# Patient Record
Sex: FEMALE | Race: WHITE | Employment: OTHER | URBAN - METROPOLITAN AREA
[De-identification: names, ages, dates, MRNs, and addresses within clinical notes are randomized per-mention and may not be internally consistent; named-entity substitution may affect disease eponyms.]

---

## 2017-10-09 ENCOUNTER — OFFICE VISIT (OUTPATIENT)
Dept: OTHER | Facility: HOSPITAL | Age: 67
End: 2017-10-09
Attending: PREVENTIVE MEDICINE

## 2017-10-09 DIAGNOSIS — Z00.00 ANNUAL PHYSICAL EXAM: Primary | ICD-10-CM

## 2017-11-01 ENCOUNTER — APPOINTMENT (OUTPATIENT)
Dept: OTHER | Facility: HOSPITAL | Age: 67
End: 2017-11-01
Attending: FAMILY MEDICINE

## 2018-12-24 NOTE — LETTER
WHERE IS YOUR PAIN NOW?  Jazmin the areas on your body where you feel the described sensations.  Use the appropriate symbol.  Jazmin the areas of radiation.  Include all affected areas.  Just to complete the picture, please draw in the face.     ACHE:  ^ ^ ^   NUMBNESS:  0000   PINS & NEEDLES:  = = = =                              ^ ^ ^                       0000              = = = =                                    ^ ^ ^                       0000            = = = =      BURNING:  XXXX   STABBING: ////                  XXXX                ////                         XXXX          ////     Please jazmin the line below indicating your degree of pain right now  with 0 being no pain 10 being the worst pain possible.                                         0             1             2              3             4              5              6              7             8             9             10         Patient Signature:             no Passive ROM deficits were identified

## 2019-06-28 ENCOUNTER — OFFICE VISIT (OUTPATIENT)
Dept: OTHER | Facility: HOSPITAL | Age: 69
End: 2019-06-28
Attending: PREVENTIVE MEDICINE

## 2019-06-28 DIAGNOSIS — Z00.00 ANNUAL PHYSICAL EXAM: Primary | ICD-10-CM

## 2019-06-28 PROCEDURE — 93005 ELECTROCARDIOGRAM TRACING: CPT

## 2022-08-19 ENCOUNTER — OFFICE VISIT (OUTPATIENT)
Dept: URGENT CARE | Facility: URGENT CARE | Age: 72
End: 2022-08-19
Payer: MEDICARE

## 2022-08-19 VITALS
OXYGEN SATURATION: 98 % | HEART RATE: 87 BPM | SYSTOLIC BLOOD PRESSURE: 115 MMHG | DIASTOLIC BLOOD PRESSURE: 78 MMHG | WEIGHT: 118 LBS

## 2022-08-19 DIAGNOSIS — Z48.01 ENCOUNTER FOR CHANGE OR REMOVAL OF SURGICAL WOUND DRESSING: Primary | ICD-10-CM

## 2022-08-19 PROCEDURE — 99202 OFFICE O/P NEW SF 15 MIN: CPT | Performed by: STUDENT IN AN ORGANIZED HEALTH CARE EDUCATION/TRAINING PROGRAM

## 2022-08-19 NOTE — PROGRESS NOTES
S: Patient is here today for suture removal.  The patient reports no complications with wound.  Sutures were placed 12 days ago.  Sutures were placed at ER outside of [place], [place].    o: Wound is well healed, no signs of secondary infection.  Sutures removed without complication.    A: Laceration    P: 14 Sutures removed, F/U PRN.    Pepe Redding DO, MBA

## 2024-04-17 ENCOUNTER — HOSPITAL ENCOUNTER (OUTPATIENT)
Dept: GENERAL RADIOLOGY | Facility: HOSPITAL | Age: 74
Discharge: HOME | End: 2024-04-17
Attending: PHYSICAL MEDICINE & REHABILITATION
Payer: MEDICARE

## 2024-04-17 ENCOUNTER — OFFICE VISIT (OUTPATIENT)
Dept: PHYSICAL MEDICINE AND REHAB | Facility: CLINIC | Age: 74
End: 2024-04-17
Payer: MEDICARE

## 2024-04-17 ENCOUNTER — LAB ENCOUNTER (OUTPATIENT)
Dept: LAB | Facility: HOSPITAL | Age: 74
End: 2024-04-17
Attending: PHYSICAL MEDICINE & REHABILITATION
Payer: MEDICARE

## 2024-04-17 VITALS — HEART RATE: 91 BPM | WEIGHT: 100 LBS | OXYGEN SATURATION: 100 %

## 2024-04-17 DIAGNOSIS — M81.0 SENILE OSTEOPOROSIS: ICD-10-CM

## 2024-04-17 DIAGNOSIS — M54.42 CHRONIC LEFT-SIDED LOW BACK PAIN WITH LEFT-SIDED SCIATICA: ICD-10-CM

## 2024-04-17 DIAGNOSIS — G89.29 CHRONIC LEFT-SIDED LOW BACK PAIN WITH LEFT-SIDED SCIATICA: ICD-10-CM

## 2024-04-17 DIAGNOSIS — M25.50 POLYARTHRALGIA: Primary | ICD-10-CM

## 2024-04-17 DIAGNOSIS — K21.9 GASTROESOPHAGEAL REFLUX DISEASE, UNSPECIFIED WHETHER ESOPHAGITIS PRESENT: ICD-10-CM

## 2024-04-17 DIAGNOSIS — F41.1 ANXIETY STATE: ICD-10-CM

## 2024-04-17 PROBLEM — E21.3 HYPERPARATHYROIDISM (HCC): Status: ACTIVE | Noted: 2023-12-14

## 2024-04-17 PROBLEM — I10 HIGH BLOOD PRESSURE: Status: ACTIVE | Noted: 2024-04-17

## 2024-04-17 PROBLEM — M65.9 TENOSYNOVITIS OF LEFT FOOT: Status: ACTIVE | Noted: 2020-01-14

## 2024-04-17 PROBLEM — M72.2 PLANTAR FASCIITIS: Status: ACTIVE | Noted: 2020-01-14

## 2024-04-17 PROBLEM — G57.50 TARSAL TUNNEL SYNDROME: Status: ACTIVE | Noted: 2020-01-14

## 2024-04-17 PROBLEM — N28.9 ABNORMAL RENAL FUNCTION: Status: ACTIVE | Noted: 2021-10-19

## 2024-04-17 PROBLEM — K22.70 BARRETT'S ESOPHAGUS: Status: ACTIVE | Noted: 2022-12-27

## 2024-04-17 PROBLEM — M25.552 PAIN OF LEFT HIP JOINT: Status: ACTIVE | Noted: 2023-11-08

## 2024-04-17 LAB
BASOPHILS # BLD AUTO: 0.02 X10(3) UL (ref 0–0.2)
BASOPHILS NFR BLD AUTO: 0.4 %
DEPRECATED RDW RBC AUTO: 52.4 FL (ref 35.1–46.3)
EOSINOPHIL # BLD AUTO: 0.02 X10(3) UL (ref 0–0.7)
EOSINOPHIL NFR BLD AUTO: 0.4 %
ERYTHROCYTE [DISTWIDTH] IN BLOOD BY AUTOMATED COUNT: 14.7 % (ref 11–15)
ERYTHROCYTE [SEDIMENTATION RATE] IN BLOOD: 18 MM/HR
HCT VFR BLD AUTO: 34 %
HGB BLD-MCNC: 11.1 G/DL
IMM GRANULOCYTES # BLD AUTO: 0.01 X10(3) UL (ref 0–1)
IMM GRANULOCYTES NFR BLD: 0.2 %
LYMPHOCYTES # BLD AUTO: 1.16 X10(3) UL (ref 1–4)
LYMPHOCYTES NFR BLD AUTO: 22.7 %
MCH RBC QN AUTO: 31.4 PG (ref 26–34)
MCHC RBC AUTO-ENTMCNC: 32.6 G/DL (ref 31–37)
MCV RBC AUTO: 96 FL
MONOCYTES # BLD AUTO: 0.39 X10(3) UL (ref 0.1–1)
MONOCYTES NFR BLD AUTO: 7.6 %
NEUTROPHILS # BLD AUTO: 3.51 X10 (3) UL (ref 1.5–7.7)
NEUTROPHILS # BLD AUTO: 3.51 X10(3) UL (ref 1.5–7.7)
NEUTROPHILS NFR BLD AUTO: 68.7 %
PLATELET # BLD AUTO: 305 10(3)UL (ref 150–450)
RBC # BLD AUTO: 3.54 X10(6)UL
RHEUMATOID FACT SERPL-ACNC: <10 IU/ML (ref ?–14)
TSI SER-ACNC: 1.66 MIU/ML (ref 0.55–4.78)
VIT D+METAB SERPL-MCNC: 55.2 NG/ML (ref 30–100)
WBC # BLD AUTO: 5.1 X10(3) UL (ref 4–11)

## 2024-04-17 PROCEDURE — 99204 OFFICE O/P NEW MOD 45 MIN: CPT | Performed by: PHYSICAL MEDICINE & REHABILITATION

## 2024-04-17 PROCEDURE — 36415 COLL VENOUS BLD VENIPUNCTURE: CPT

## 2024-04-17 PROCEDURE — 85025 COMPLETE CBC W/AUTO DIFF WBC: CPT

## 2024-04-17 PROCEDURE — 85652 RBC SED RATE AUTOMATED: CPT

## 2024-04-17 PROCEDURE — 1159F MED LIST DOCD IN RCRD: CPT | Performed by: PHYSICAL MEDICINE & REHABILITATION

## 2024-04-17 PROCEDURE — 82164 ANGIOTENSIN I ENZYME TEST: CPT

## 2024-04-17 PROCEDURE — 72100 X-RAY EXAM L-S SPINE 2/3 VWS: CPT | Performed by: PHYSICAL MEDICINE & REHABILITATION

## 2024-04-17 PROCEDURE — 1125F AMNT PAIN NOTED PAIN PRSNT: CPT | Performed by: PHYSICAL MEDICINE & REHABILITATION

## 2024-04-17 PROCEDURE — 84443 ASSAY THYROID STIM HORMONE: CPT

## 2024-04-17 PROCEDURE — 86225 DNA ANTIBODY NATIVE: CPT

## 2024-04-17 PROCEDURE — 82306 VITAMIN D 25 HYDROXY: CPT

## 2024-04-17 PROCEDURE — 86200 CCP ANTIBODY: CPT

## 2024-04-17 PROCEDURE — 86038 ANTINUCLEAR ANTIBODIES: CPT

## 2024-04-17 PROCEDURE — 86431 RHEUMATOID FACTOR QUANT: CPT

## 2024-04-17 RX ORDER — EPINEPHRINE 0.3 MG/.3ML
1 INJECTION SUBCUTANEOUS DAILY
COMMUNITY

## 2024-04-17 RX ORDER — ESOMEPRAZOLE MAGNESIUM 40 MG/1
40 CAPSULE, DELAYED RELEASE ORAL 2 TIMES DAILY
COMMUNITY

## 2024-04-17 RX ORDER — ASPIRIN 81 MG/1
TABLET ORAL
COMMUNITY

## 2024-04-17 RX ORDER — TRIAMCINOLONE ACETONIDE 55 UG/1
SPRAY, METERED NASAL
COMMUNITY

## 2024-04-17 NOTE — PROGRESS NOTES
[facility]  Progress Note    CHIEF COMPLAINT:    Chief Complaint   Patient presents with    New Patient     New R handed patient is here for b/l low back and b/l hip pain. Denies injury. She' shad low back pain for years and the hip pain began in 10/2023. XR hips 11/09/23. PT for L sciatic pain with no relief. T/n in L hip that radiates down LLE. She is unable to sit more than 10 mins from the pain. L hip injection on 01/08/24 with no relief. Pain 8/10. Tylenol prn.        History of Present Illness:  Melanie Smiley is a 74 year old female who is being seen for a new patient evaluation. She is a neighbor and friend of Ismael Humphrey.  She is a nebulous historian with widespread pain from the neck, down the posterior torso into the left buttock, leg and foot.  The right low back and buttock also hurts.  She also showed me a left knee brace that she uses.  She tells me she saw an orthopedist who treated her for left hip pain with what appears to be a trochanteric bursa injection which was no help.  Despite the fact she received a steroid injection, she states she can't take steroids due to a diagnosis of pemphigus.  She did bring in an x-ray of the hip.  Prolonged sitting, standing or any activity is not possible due to the pain.  Pain is 8/10.    PAST MEDICAL HISTORY:  Past Medical History:    Allergic rhinitis    Essential hypertension       SURGICAL HISTORY:  No past surgical history on file.    SOCIAL HISTORY:   Social History     Occupational History    Not on file   Tobacco Use    Smoking status: Never    Smokeless tobacco: Not on file   Substance and Sexual Activity    Alcohol use: No    Drug use: No    Sexual activity: Not on file       CURRENT MEDICATIONS:   Current Outpatient Medications   Medication Sig Dispense Refill    aspirin 81 MG Oral Tab EC 0      Esomeprazole Magnesium 40 MG Oral Capsule Delayed Release Take 1 capsule (40 mg total) by mouth 2 (two)  times daily.      Nutritional Supplements (JUICE PLUS FIBRE) Oral Liquid       triamcinolone (NASACORT ALLERGY 24HR) 55 MCG/ACT Nasal Aerosol Spray 2 sprays every day by intranasal route.      Losartan Potassium (COZAAR) 50 MG Oral Tab Take by mouth daily.      Cholecalciferol (VITAMIN D) 1000 UNITS Oral Tab Take by mouth.      EPINEPHrine 0.3 MG/0.3ML Injection Solution Auto-injector Inject 1 mL (3.3333 each total) into the muscle daily.      Metoprolol Succinate ER (TOPROL XL) 25 MG Oral Tablet 24 Hr Take 25 mg by mouth daily. (Patient not taking: Reported on 4/17/2024)      Fluticasone Propionate (FLONASE) 50 MCG/ACT Nasal Suspension by Each Nare route daily. (Patient not taking: Reported on 4/17/2024)         ALLERGIES:   Allergies   Allergen Reactions    Pseudoephedrine HIVES, RASH and OTHER (SEE COMMENTS)     TACHYCARDIA    Other reaction(s): Unknown   TACHYCARDIA    TACHYCARDIA   Other reaction(s): Unknown   TACHYCARDIA    Hycodan CONFUSION    Amoxicillin RASH       REVIEW OF SYSTEMS:   Patient-reported ROS  Constitutional  Sleep Disturbance: admits  Chills: denies  Fever: denies  Weight Gain: denies  Weight Loss: denies   Cardiovascular  Chest Pain: denies  Irregular Heartbeat: denies   Respiratory  Painful Breathing: denies  Wheezing: denies   Gastrointestinal  Bowel Incontinence: denies  Heartburn: denies  Abdominal Pain: denies  Blood in Stool : denies  Rectal Pain: denies   Hematology  Easy Bruising: denies  Easy Bleeding: denies   Genitourinary  Difficulty Urinating: denies  Bladder Incontinence: denies  Pelvic Pain: denies  Painful Urination: denies   Musculoskeletal  Joint Stiffness: admits  Painful Joints: admits  Tailbone Pain: denies  Swollen Joints: denies   Peripheral Vascular  Swelling of Legs/Feet: denies  Cold Extremities: denies   Skin  Open Sores: denies  Nodules or Lumps: denies  Rash: denies   Neurological  Loss of Strength Since last Visit: denies  Tingling/Numbness: admits  Balance:  denies   Psychiatric  Anxiety: denies  Depressed Mood: denies             PHYSICAL EXAM:   Pulse 91   Wt 100 lb (45.4 kg)   SpO2 100%     There is no height or weight on file to calculate BMI.      General: No immediate distress  Head: Normocephalic/ Atraumatic  Extremities: No lower extremity edema bilaterally. Peripheral pulses intact.   Spine: No percussion tenderness, functional range of motion with pain at end range  Hips: Slightly limited range of motion, right worse than left.  This is opposite of her symptom complaints.  Neuro:   Cognition: alert & oriented x 3, attentive, able to follow 2 step commands, comprehention intact, spontaneous speech intact  Strength: Lower extremities have 5/5 strength  Sensation: Normal lower extremities  Reflexes: Normal lower extremities  SLR: neg    Data    Radiology Imaging:  I personally reviewed plain film x-rays of the hips showing right worse than left hip osteoarthritis with chondrocalcinosis.      ASSESSMENT AND PLAN:  1. Polyarthralgia  She seems to have nonspecific polyarthralgia.  Because of this I am recommending a rheumatologic serology screen.  If no significant abnormalities consider starting with Cymbalta  - Angiotensin converting enzyme(ACE); Future  - CBC With Differential With Platelet; Future  - Cyclic Citrullinate Pep. IGG; Future  - Rheumatoid Arthritis Factor; Future  - Sed Rate, Westergren (Automated); Future  - Assay, Thyroid Stim Hormone; Future  - Vitamin D; Future    2. Chronic left-sided low back pain with left-sided sciatica  I also recommend a plain film x-ray lumbar spine given history of back and left leg pain.  May consider PT, probably flexion-based lumbar stabilization if no significant abnormalities.  - XR LUMBAR SPINE (MIN 2 VIEWS) (CPT=72100); Future    3. Gastroesophageal reflux disease, unspecified whether esophagitis present  No NSAIDs, limits medication options    4. Senile osteoporosis  Avoiding steroids, limits medication  options    5. Anxiety state  Negative comorbidity for painful conditions.  Cymbalta may be helpful long-term.        RTC: Post imaging and lab review      The patient was in agreement with the assessment and plan.  All questions were answered.        Azar Kutrz MD  Physical Medicine and Rehabilitation/Sports Medicine  [facility]

## 2024-04-18 PROBLEM — M25.50 POLYARTHRALGIA: Status: ACTIVE | Noted: 2024-04-18

## 2024-04-18 LAB
ACE: 41 U/L
CCP IGG SERPL-ACNC: <0.4 U/ML (ref 0–6.9)
DSDNA IGG SERPL IA-ACNC: 1.4 IU/ML
ENA AB SER QL IA: 0.1 UG/L
ENA AB SER QL IA: NEGATIVE

## 2024-04-19 ENCOUNTER — TELEPHONE (OUTPATIENT)
Dept: PHYSICAL MEDICINE AND REHAB | Facility: CLINIC | Age: 74
End: 2024-04-19

## 2024-04-19 DIAGNOSIS — G89.29 CHRONIC LEFT-SIDED LOW BACK PAIN WITH LEFT-SIDED SCIATICA: Primary | ICD-10-CM

## 2024-04-19 DIAGNOSIS — M54.42 CHRONIC LEFT-SIDED LOW BACK PAIN WITH LEFT-SIDED SCIATICA: Primary | ICD-10-CM

## 2024-04-19 RX ORDER — DULOXETIN HYDROCHLORIDE 20 MG/1
20 CAPSULE, DELAYED RELEASE ORAL DAILY
Qty: 30 CAPSULE | Refills: 0 | Status: SHIPPED | OUTPATIENT
Start: 2024-04-19

## 2024-04-19 NOTE — TELEPHONE ENCOUNTER
Per Dr Kurtz \"Please let the patient know that her blood tests look good with the exception of mild anemia.  That has nothing to do with her joint aches and pains but should be addressed by her family physician if not already addressed.  I am recommending Cymbalta for the pain which we discussed in the office.  I recommend starting 20 mg daily.  If she is okay with that, please pend me an order for that and I will sign it.   Also her back x-ray shows arthritis.  I recommend physical therapy at [facility] in [place].  If she is okay with that please pend me an order and I will sign it.  She should return in 4 weeks, please help her make an appointment. \"      Spoke with patient who wanted to try Cylmbalta as recommended. Order pended and routed to Dr Kurtz to sign.     In regards to Physical Therapy, patient did not prefer to do PT at [facility] in [place]. However she will think about it and call us back on that.     Offered to schedule patient for her 4 week follow up visit. Patient stated she is not at home at this time and she wanted to call office back once she has her schedule with her.     Informed patient that our office closes at 1:00 pm today, however we are open again Monday-Thursday 08:00 am until 04:00 pm.     Patient verbalized understanding.

## 2024-04-19 NOTE — TELEPHONE ENCOUNTER
Pt is requesting to have blood work results and xray imaging report faxed to her PCP- Dr. Leatha Pozo.   Fax: [phone]

## 2024-04-22 NOTE — TELEPHONE ENCOUNTER
Patient called to ask if ok to go elsewhere for PT if ok. Dr. Kurtz recommend [facility] in [place] but she prefers to go elsewhere.  This RN advised it is ok to go wherever she prefers and is convenient for her.    PT:  [facility]  Phone: [phone]     AttN:  Delroy Gonzales  Fax:       Order pended for Physical Therapy and routed to Dr. Kurtz for review & signature.    NOTE:  Please route back to [facility] so we may fax order to [facility].        LOV PLAN from 4/17/2024 :  \"May consider PT, probably flexion-based lumbar stabilization if no significant abnormalities. \"

## 2024-04-22 NOTE — TELEPHONE ENCOUNTER
Spoke with [facility] and faxed PT order to the provided fax# [phone]    Patient thankful. No further actions needed. Closing the encounter.

## 2024-04-23 ENCOUNTER — TELEPHONE (OUTPATIENT)
Dept: PHYSICAL MEDICINE AND REHAB | Facility: CLINIC | Age: 74
End: 2024-04-23

## 2024-04-23 NOTE — TELEPHONE ENCOUNTER
Unfortunately because of her other medical problems, she cannot take any other medications.  Right now I would rely on PT to help her best.

## 2024-04-23 NOTE — TELEPHONE ENCOUNTER
Patient started Duloxetine yesterday and is having several side effects with having difficulty sleeping, weakness, tremors, lightheaded, chills, nausea and tingling in patient's face.     Educated patient to stop taking the medication. Routing to Dr Kurtz as high priority.

## 2024-04-23 NOTE — TELEPHONE ENCOUNTER
Pt called to speak with a nurse regarding side effects she is experiencing from a new medication she just started: duloxetine 20 mg capsule.

## 2024-04-24 NOTE — TELEPHONE ENCOUNTER
Patient was educated on Dr Kurtz's recommendation and note. Patient verbalized understanding. Patient also was asking as in [employer] she was able to see the lab results, however not able to access the normal ranges. She asked if possible to send a N[employer] message with the lab values shown. My Chart message sent.     No further questions at this time. Closing this encounter.    Patient/Caregiver provided printed discharge information.

## 2024-06-24 ENCOUNTER — MED REC SCAN ONLY (OUTPATIENT)
Dept: PHYSICAL MEDICINE AND REHAB | Facility: CLINIC | Age: 74
End: 2024-06-24

## (undated) NOTE — LETTER
[address] [place] [id number]  [phone]             REFERRAL ORDER         Patient Name:   Melanie Smiley, ([id number])   Sex: female  : 4/3/1950       Order Date:  2024  Authorizing Provider:   ZEHRA GRIFFITH     Procedure:  PHYSICAL THERAPY EXTERNAL [[id number]]         Order #:   [id number]  Qty:  1     Priority:  Routine                   Class:   Ext - RFL     Standing Interval:          Standing Occurrences:          Expires on:            Expected by:    Associated DX:  Chronic left-sided low back pain with left-sided sciatica (M54.42,G89.29)     Order summary:  Physical Therapy Frequency: 2-3 per week for 3-4 weeks  Treatment Modalities: Minimal as needed Soft tissue mobilization: Iliopsoas, rectus femoris, paraspinals Flexion based spinal stabilization Core stabilization including latissimus dorsi and gluts Pr  ovide home exercise program Call with questions, Ext - RFL, Routine, 1 visit            Comments:  Physical Therapy  Frequency: 2-3 per week for 3-4 weeks     Treatment  Modalities: Minimal as needed  Soft tissue mobilization: Iliopsoas, rectus femoris, paraspinals  Flexion based spinal stabilization  Core stabilization including latissimus dorsi and gluts  Provide home exercise program  Call with questions           Scheduling Instructions:  **REFERRAL REQUEST**     Your physician has referred you to a specialist.  Your physician or the clinic staff will provide you with the phone number you should call to schedule your appointment.      If you are confident that your benefit plan will not require a referral or authorization, such as [place] Medicaid, please feel free to schedule your appointment immediately. However, if you are unsure about the requirements for authorization, please wait 5-7 days and then contact your physician's   office. At that time, you will be provided with any authorization numbers or be assured  that none are required. You can then schedule your appointment. Failure to obtain required authorization numbers can create reimbursement difficulties for you.  Electronically Signed By: Azar Kurtz MD [NPI: [id number]]  Order Date: Apr 22, 2024 at 3:37 PM

## (undated) NOTE — LETTER
[facility]  [address]  [place] [address]  [phone]          [facility]  Department of Radiology  [address], [place] [address] [phone]      Name: Melanie Smiley Dr: Azar Kurtz MD  : 4/3/1950    Age/Sex: 74 year old Female  Pt. Phone: [phone]  Exam Date: 2024  Procedure: XR LUMBAR SPINE (MIN 2 VIEWS) (CPT=72100)   -----------------------------------------------------------------------------------------------------------------------------------------------                  Azar Kurtz MD  [address]  [place] [address]      Interpretation   PROCEDURE:XR LUMBAR SPINE (MIN 2 VIEWS) (CPT=72100)     COMPARISON:None.     INDICATIONS:Chronic left-sided low back pain with left-sided sciatica     TECHNIQUE:    Lumbar spine radiographs (2-3 views)       FINDINGS:             ALIGNMENT:    Normal alignment.    VERTEBRAL BODIES:               Generalized osteopenia.  No acute fracture or malalignment.  Mild degenerative anterolisthesis L4 on L5.  Moderate chronic appearing L1 compression deformity.  Multilevel degenerative change with marginal osteophyte formation and   facet arthropathy.  DISC SPACES:Multilevel disc space narrowing most significant at L4-L5 and L5-S1.  SACROILIAC JOINTS:Intact.  OTHER:Atherosclerotic vascular calcification.              =====  CONCLUSION:      Moderate lumbar spine degenerative change.  Mild degenerative anterolisthesis L4 on L5.     Chronic appearing moderate L1 compression deformity.           Dictated by (CST): Gunnar Jones MD on 2024 at 6:07 PM       Finalized by (CST): Gunnar Jones MD on 2024 at 6:08 PM                 This letter (including any attachments) contains confidential information intended only for the addressee.  Any use or disclosure by any other person is unlawful.  If you are not the intended recipient, please  notify our department immediately at [phone] and we will make arrangements for the return of the information to [facility].  You are hereby notified that any disclosure, copying, or distribution of this letter, or the taking of any action based on it, is strictly prohibited by law.

## (undated) NOTE — LETTER
[facility], [place]  [address]  [facility] [address]  [phone]        Connective Tissue Disease (JEYSON) Screen  Order: [id number]   Collected 4/17/2024 10:44 AM    1 Result Note      Component  Ref Range & Units 2 d ago   Expanded CLARKE Antibody Screen, IGG  <0.7 ug/l 0.10   Anti-dsDNA antibody  <10 IU/mL 1.4   Connective Tissue Disease Screen Interpretation  Negative Negative        View Full Report        Narrative    JEYSON screen determined using [employer] by fluorescent enzyme immunoassay. Both a specific dsDNA test along with an CLARKE screen detecting antibodies to recombinant U1RNP (RNP 70, A, C), SS-A/Ro, SS-B/La, Centromere B, Scl-70, Kaitlin-1 proteins and a synthetic SmD3 peptide are utilized to determine JEYSON screen results.      Specimen Collected: 04/17/24 10:44 AM Last Resulted: 04/18/24  2:17 PM             Related Results     Cyclic Citrullinate Pep. IGG Final result 4/17/2024                     Rheumatoid Arthritis Factor Final result 4/17/2024                     Vitamin D, 25-Hydroxy Final result 4/17/2024                       Result Care Coordination      Result Notes     Azar Kurtz MD  4/19/2024  8:18 AM CDT Back to Top      Please let the patient know that her blood tests look good with the exception of mild anemia.  That has nothing to do with her joint aches and pains but should be addressed by her family physician if not already addressed.  I am recommending Cymbalta for the pain which we discussed in the office.  I recommend starting 20 mg daily.  If she is okay with that, please pend me an order for that and I will sign it.     Also her back x-ray shows arthritis.  I recommend physical therapy at [facility] in [place].  If she is okay with that please pend me an order and I will sign it.  She should return in 4 weeks, please help her make an appointment.     Patient Communication     Add Comments   Not seen Back to  Top           Vitamin D  Order: [id number]   Collected 4/17/2024 10:44 AM    0 Result Notes  View Full Report        Specimen Collected: 04/17/24 10:44 AM Last Resulted: 04/17/24 12:34 PM             Result Care Coordination      Patient Communication     Not Released  Not seen Back to Top         Vitamin D, 25-Hydroxy  Order: [id number] - Part of Panel Order [id number]   Collected 4/17/2024 10:44 AM      1 Result Note          Component  Ref Range & Units 2 d ago   Vitamin D, 25OH, Total  30.0 - 100.0 ng/mL 55.2   Comment: Literature Recommendations for 25(OH)D levels are:  Range           Vitamin D Status   <20    ng/mL      Deficiency   20-<30 ng/mL      Insufficiency    ng/mL      Sufficiency   >100   ng/mL      Toxicity    *Clinical controversy exists regarding optimal 25(OH)D levels. Emerging evidence links potential adverse effects to high levels, particularly >60 ng/mL.          View Full Report        Specimen Collected: 04/17/24 10:44 AM Last Resulted: 04/17/24 12:34 PM             Result Care Coordination      Result Notes     Azar uKrtz MD  4/19/2024  8:18 AM CDT Back to Top        Please let the patient know that her blood tests look good with the exception of mild anemia.  That has nothing to do with her joint aches and pains but should be addressed by her family physician if not already addressed.  I am recommending Cymbalta for the pain which we discussed in the office.  I recommend starting 20 mg daily.  If she is okay with that, please pend me an order for that and I will sign it.     Also her back x-ray shows arthritis.  I recommend physical therapy at [facility] in [place].  If she is okay with that please pend me an order and I will sign it.  She should return in 4 weeks, please help her make an appointment.     Patient Communication     Add Comments   Seen Back to Top           Assay, Thyroid Stim Hormone  Order: [id number]   Collected 4/17/2024 10:44 AM    1 Result Note       Component  Ref Range & Units 2 d ago   TSH  0.550 - 4.780 mIU/mL 1.660        View Full Report        Specimen Collected: 04/17/24 10:44 AM Last Resulted: 04/17/24 12:14 PM             Result Care Coordination      Result Notes     Azar Kurtz MD  4/19/2024  8:18 AM CDT Back to Top      Please let the patient know that her blood tests look good with the exception of mild anemia.  That has nothing to do with her joint aches and pains but should be addressed by her family physician if not already addressed.  I am recommending Cymbalta for the pain which we discussed in the office.  I recommend starting 20 mg daily.  If she is okay with that, please pend me an order for that and I will sign it.     Also her back x-ray shows arthritis.  I recommend physical therapy at [facility] in [place].  If she is okay with that please pend me an order and I will sign it.  She should return in 4 weeks, please help her make an appointment.     Patient Communication     Add Comments   Seen Back to Top           Sed RateAnmol (Automated)  Order: [id number]   Collected 4/17/2024 10:44 AM    1 Result Note      Component  Ref Range & Units 2 d ago   Sed Rate  0 - 30 mm/Hr 18        View Full Report        Specimen Collected: 04/17/24 10:44 AM Last Resulted: 04/17/24 11:34 AM             Related Results     CBC W/ DIFFERENTIAL Final result 4/17/2024                       Result Care Coordination      Result Notes     Azar Kurtz MD  4/19/2024  8:18 AM CDT Back to Top      Please let the patient know that her blood tests look good with the exception of mild anemia.  That has nothing to do with her joint aches and pains but should be addressed by her family physician if not already addressed.  I am recommending Cymbalta for the pain which we discussed in the office.  I recommend starting 20 mg daily.  If she is okay with that, please pend me an order for that and I will sign it.     Also her back x-ray shows  arthritis.  I recommend physical therapy at [facility] in [place].  If she is okay with that please pend me an order and I will sign it.  She should return in 4 weeks, please help her make an appointment.     Patient Communication     Add Comments   Seen Back to Top           Rheumatoid Arthritis Factor  Order: [id number]   Collected 4/17/2024 10:44 AM    1 Result Note      Component  Ref Range & Units 2 d ago   Rheumatoid Factor  <14 IU/mL <10        View Full Report        Specimen Collected: 04/17/24 10:44 AM Last Resulted: 04/17/24 12:34 PM             Related Results     Cyclic Citrullinate Pep. IGG Final result 4/17/2024                     Connective Tissue Disease (JEYSON) Screen Final result 4/17/2024                     Vitamin D, 25-Hydroxy Final result 4/17/2024                       Result Care Coordination      Result Notes     Azar Kurtz MD  4/19/2024  8:18 AM CDT Back to Top      Please let the patient know that her blood tests look good with the exception of mild anemia.  That has nothing to do with her joint aches and pains but should be addressed by her family physician if not already addressed.  I am recommending Cymbalta for the pain which we discussed in the office.  I recommend starting 20 mg daily.  If she is okay with that, please pend me an order for that and I will sign it.     Also her back x-ray shows arthritis.  I recommend physical therapy at [facility] in [place].  If she is okay with that please pend me an order and I will sign it.  She should return in 4 weeks, please help her make an appointment.     Patient Communication     Add Comments   Seen Back to Top           Cyclic Citrullinate Pep. IGG  Order: [id number]   Collected 4/17/2024 10:44 AM    1 Result Note      Component  Ref Range & Units 2 d ago   C-Citrullinated Peptide IgG AB  0.0 - 6.9 U/mL <0.4        View Full Report        Specimen Collected: 04/17/24 10:44 AM Last Resulted: 04/18/24  2:17 PM              Related Results     Connective Tissue Disease (JEYSON) Screen Final result 4/17/2024                     Rheumatoid Arthritis Factor Final result 4/17/2024                     Vitamin D, 25-Hydroxy Final result 4/17/2024                       Result Care Coordination      Result Notes     Azar Kurtz MD  4/19/2024  8:18 AM CDT Back to Top      Please let the patient know that her blood tests look good with the exception of mild anemia.  That has nothing to do with her joint aches and pains but should be addressed by her family physician if not already addressed.  I am recommending Cymbalta for the pain which we discussed in the office.  I recommend starting 20 mg daily.  If she is okay with that, please pend me an order for that and I will sign it.     Also her back x-ray shows arthritis.  I recommend physical therapy at [facility] in [place].  If she is okay with that please pend me an order and I will sign it.  She should return in 4 weeks, please help her make an appointment.     Patient Communication     Add Comments   Not seen Back to Top            Contains abnormal data CBC With Differential With Platelet  Order: [id number]   Collected 4/17/2024 10:44 AM    0 Result Notes  View Full Report        Specimen Collected: 04/17/24 10:44 AM Last Resulted: 04/17/24 11:25 AM             Result Care Coordination      Patient Communication     Not Released  Not seen Back to Top          Contains abnormal data CBC W/ DIFFERENTIAL  Order: [id number] - Part of Panel Order [id number]   Collected 4/17/2024 10:44 AM      1 Result Note          Component  Ref Range & Units 2 d ago   WBC  4.0 - 11.0 x10(3) uL 5.1   RBC  3.80 - 5.30 x10(6)uL 3.54 Low    HGB  12.0 - 16.0 g/dL 11.1 Low    HCT  35.0 - 48.0 % 34.0 Low    MCV  80.0 - 100.0 fL 96.0   MCH  26.0 - 34.0 pg 31.4   MCHC  31.0 - 37.0 g/dL 32.6   RDW-SD  35.1 - 46.3 fL 52.4 High    RDW  11.0 - 15.0 % 14.7   PLT  150.0 - 450.0 10(3)uL 305.0   Neutrophil Absolute  Prelim  1.50 - 7.70 x10 (3) uL 3.51   Neutrophil Absolute  1.50 - 7.70 x10(3) uL 3.51   Lymphocyte Absolute  1.00 - 4.00 x10(3) uL 1.16   Monocyte Absolute  0.10 - 1.00 x10(3) uL 0.39   Eosinophil Absolute  0.00 - 0.70 x10(3) uL 0.02   Basophil Absolute  0.00 - 0.20 x10(3) uL 0.02   Immature Granulocyte Absolute  0.00 - 1.00 x10(3) uL 0.01   Neutrophil %  % 68.7   Lymphocyte %  % 22.7   Monocyte %  % 7.6   Eosinophil %  % 0.4   Basophil %  % 0.4   Immature Granulocyte %  % 0.2        View Full Report        Specimen Collected: 04/17/24 10:44 AM Last Resulted: 04/17/24 11:25 AM             Result Care Coordination      Result Notes     Azar Kurtz MD  4/19/2024  8:18 AM CDT Back to Top        Please let the patient know that her blood tests look good with the exception of mild anemia.  That has nothing to do with her joint aches and pains but should be addressed by her family physician if not already addressed.  I am recommending Cymbalta for the pain which we discussed in the office.  I recommend starting 20 mg daily.  If she is okay with that, please pend me an order for that and I will sign it.     Also her back x-ray shows arthritis.  I recommend physical therapy at [facility] in [place].  If she is okay with that please pend me an order and I will sign it.  She should return in 4 weeks, please help her make an appointment.     Patient Communication     Add Comments   Seen Back to Top           Angiotensin converting enzyme(ACE)  Order: [id number]   Collected 4/17/2024 10:44 AM    1 Result Note      Component  Ref Range & Units 2 d ago   ACE  14 - 82 U/L 41        View Full Report        Narrative    Performed at:  [address]  [id number]  : Burt Mendoza PhD, Phone:  [phone]      Specimen Collected: 04/17/24 10:44 AM Last Resulted: 04/18/24  2:07 PM             Result Care Coordination      Result Notes     Azar Kurtz MD  4/19/2024  8:18  AM CDT Back to Top      Please let the patient know that her blood tests look good with the exception of mild anemia.  That has nothing to do with her joint aches and pains but should be addressed by her family physician if not already addressed.  I am recommending Cymbalta for the pain which we discussed in the office.  I recommend starting 20 mg daily.  If she is okay with that, please pend me an order for that and I will sign it.     Also her back x-ray shows arthritis.  I recommend physical therapy at [facility] in [place].  If she is okay with that please pend me an order and I will sign it.  She should return in 4 weeks, please help her make an appointment.     Patient Communication     Add Comments   Not seen Back to Top